# Patient Record
(demographics unavailable — no encounter records)

---

## 2024-10-20 NOTE — PHYSICAL EXAM
[General Appearance - Well Developed] : well developed [Normal Appearance] : normal appearance [Well Groomed] : well groomed [General Appearance - Well Nourished] : well nourished [No Deformities] : no deformities [General Appearance - In No Acute Distress] : no acute distress [Normal Conjunctiva] : the conjunctiva exhibited no abnormalities [Eyelids - No Xanthelasma] : the eyelids demonstrated no xanthelasmas [Normal Oral Mucosa] : normal oral mucosa [No Oral Pallor] : no oral pallor [No Oral Cyanosis] : no oral cyanosis [Normal Jugular Venous A Waves Present] : normal jugular venous A waves present [Normal Jugular Venous V Waves Present] : normal jugular venous V waves present [No Jugular Venous Lara A Waves] : no jugular venous lara A waves [Heart Rate And Rhythm] : heart rate and rhythm were normal [Heart Sounds] : normal S1 and S2 [Murmurs] : no murmurs present [Arterial Pulses Normal] : the arterial pulses were normal [Edema] : no peripheral edema present [Respiration, Rhythm And Depth] : normal respiratory rhythm and effort [Exaggerated Use Of Accessory Muscles For Inspiration] : no accessory muscle use [Auscultation Breath Sounds / Voice Sounds] : lungs were clear to auscultation bilaterally [Abdomen Soft] : soft [Abdomen Tenderness] : non-tender [Abdomen Mass (___ Cm)] : no abdominal mass palpated [Abnormal Walk] : normal gait [Gait - Sufficient For Exercise Testing] : the gait was sufficient for exercise testing [Nail Clubbing] : no clubbing of the fingernails [Cyanosis, Localized] : no localized cyanosis [Petechial Hemorrhages (___cm)] : no petechial hemorrhages [] : no ischemic changes [Oriented To Time, Place, And Person] : oriented to person, place, and time [Affect] : the affect was normal [Mood] : the mood was normal [No Anxiety] : not feeling anxious

## 2024-10-30 NOTE — REASON FOR VISIT
[FreeTextEntry1] : 61 year-old female with no significant past medical history presents for followup.    Patient was last seen on 12/26/23 - Patient reports CP described as sharp pain, palpitations, SOB, almost every night for 2-3 months.  Patient reports HA for 2 weeks.  Patient reports BP difference between 2 arms.  Patient reports dizziness, feeling like she was punched.  MRI with PCP unremarkable.  Patient reports family history of cardiac dz.  I advised patient to undergo an echocardiogram and a treadmill stress test.  Patient underwent an echocardiogram and it showed normal LV function without significant valvular pathology. Patient underwent a treadmill stress test and completed 9 minutes of Josué protocol.  There were upsloping ST depressions on ECG but no symptoms.  Following treadmill stress, there was no echocardiographic evidence of ischemia.  Patient wore a Holter and it showed average HR of 88, rare APC's, frequent PVC's (11%) without significant arrhythmia.  I advised patient to try 1/2 tab of Metoprolol ER 25 mg.

## 2024-10-30 NOTE — HISTORY OF PRESENT ILLNESS
[FreeTextEntry1] : 10/24/24 - Please refer to NP note below.  Pt reports occasional left CP for 3 months, not related to activities, lasting for a few minutes, relieved with deep breathing. Pt also reports occasional dizziness for 3 months, described as hit on something, lasting for a few minutes. Pt reports occasional palpitations at night for 3 months, lasting for a few hours. Pt reports occasional SOB associated with CP and palpitations. Pt reports poor sleeping and fatigue recently.  Pt is on Propranolol 20mg.  Today's /74 P 64.  Exam unremarkable.  ECG showed NSR with PVC's.  I advised patient to undergo a treadmill stress test.  I advised patient to undergo a CTA of coronary arteries.  I will obtain insurance authorization.    12/26/23 - Patient reports CP described as sharp pain, palpitations, SOB, almost every night for 2-3 months.  Patient reports HA for 2 weeks.  Patient reports BP difference between 2 arms.  Patient reports dizziness, feeling like she was punched.  MRI with PCP unremarkable.  Patient reports family history of cardiac dz.  I advised patient to undergo an echocardiogram and a treadmill stress test.  Patient underwent an echocardiogram and it showed normal LV function without significant valvular pathology. Patient underwent a treadmill stress test and completed 9 minutes of Josué protocol.  There were upsloping ST depressions on ECG but no symptoms.  Following treadmill stress, there was no echocardiographic evidence of ischemia.  Patient wore a Holter and it showed average HR of 88, rare APC's, frequent PVC's (11%) without significant arrhythmia.  I advised patient to try 1/2 tab of Metoprolol ER 25 mg.     7/28/15 - Patient reports that last night while on a flight back from [place] she experienced sudden onset of substernal chest discomfort, extending from her throat to lower chest, described as squeezing discomfort, not related to exertion, lasting 5 minutes.  She felt like she was unable to breath.  After she arrived home, she experienced another episode, lasting 10 minutes.  She reports occasional skipped heartbeats.  At baseline, patient denies chest pain or shortness of breath with exertion. She reports occasional palpitations. She denies history of syncope.  She had EGD 3 years ago.  She was positive of H. Pylori but was unable to tolerate the antibiotics.  Patient underwent a stress echo and completed 7 minutes of Josué protocol. There was no ECG or echocardiographic evidence of ischemia.  Her symptom may be GI in etiology.

## 2025-02-07 NOTE — HEALTH RISK ASSESSMENT
[Patient reported mammogram was normal] : Patient reported mammogram was normal [Patient reported PAP Smear was normal] : Patient reported PAP Smear was normal [Patient reported bone density results were normal] : Patient reported bone density results were normal [Patient reported colonoscopy was normal] : Patient reported colonoscopy was normal [Fully functional (bathing, dressing, toileting, transferring, walking, feeding)] : Fully functional (bathing, dressing, toileting, transferring, walking, feeding) [Fully functional (using the telephone, shopping, preparing meals, housekeeping, doing laundry, using] : Fully functional and needs no help or supervision to perform IADLs (using the telephone, shopping, preparing meals, housekeeping, doing laundry, using transportation, managing medications and managing finances) [Reports normal functional visual acuity (ie: able to read med bottle)] : Reports normal functional visual acuity [Good] : ~his/her~  mood as  good [No falls in past year] : Patient reported no falls in the past year [Never] : Never [NO] : No [Reports changes in hearing] : Reports no changes in hearing [Reports changes in vision] : Reports no changes in vision [Reports changes in dental health] : Reports no changes in dental health [MammogramDate] : 9/24 [PapSmearDate] : 2019 [BoneDensityDate] : 9/24 [ColonoscopyDate] : 7/21 [ColonoscopyComments] : 10 years

## 2025-02-07 NOTE — PHYSICAL EXAM
[Grossly Normal Strength/Tone] : grossly normal strength/tone [Normal] : affect was normal and insight and judgment were intact [de-identified] : b/l calves soft, not tender. [de-identified] : 2 erythematous raised areas of swelling, very tender and hot to touch.

## 2025-02-07 NOTE — HISTORY OF PRESENT ILLNESS
[FreeTextEntry1] : annual [de-identified] : 63 yo F with hx PVCs presents for annual.  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  b/l calf spasms-- needs to massage out daily. Calves harden and gets very painful. Same with shoulders/neck. Pt does go on daily walks-- at least 10k steps. Doesn't stretch before or after. Denies stressors.   SOB-- friends have noticed she sighs or takes deep breaths frequently. Pt seen cardiology -- CTA with calcium score 0. No lung findings. Pt concerned for lung findings as she was exposed to secondhand smoke.   Received 2nd dose shingrix yesterday. Arm painful. 2 red areas of swelling, but only 1 needle.

## 2025-02-12 NOTE — ASSESSMENT
[FreeTextEntry1] : The benefits of adequate calcium intake and a daily multivitamin along with routine daily cardiovascular exercise were reviewed with the patient.  The patient was informed regarding the benefits of a YEARLY screening FOR SKIN CANCER  The importance of safer-sex was discussed with the patient. We reviewed ASCCP/ACOG guidelines for pap smears. DISCUSSED PRECAUTIONS AGAINST COVID19, INCLUDING MASK WEARING, SOCIAL DISTANCING AND HAND WASHING.  PAP DONE             DEXA     REF GIVEN            TEOFILO AND BREAST US REF GIVEN-Recommended following up with dermatology for annual skin surveillance. -Follow up in 1 year for annual GYN exam or PRN. All questions and concerns were discussed.

## 2025-02-12 NOTE — HISTORY OF PRESENT ILLNESS
[FreeTextEntry1] : HERE FOR pap smear/GYN examination. Feels well.   [de-identified] : HERE FOR pap smear/GYN examination. Feels well.   Mammogram: 9/24. Patient reported mammogram was normal.   PAP Smear: 2019. Patient reported PAP Smear was normal.  Colonoscopy: 7/21. Patient reported colonoscopy was normal. 10 years.   Diet-- ok exercise-- not exercising

## 2025-02-12 NOTE — PHYSICAL EXAM
[Normal] : no acute distress, well nourished, well developed and well-appearing [Clear to Auscultation] : lungs were clear to auscultation bilaterally [Normal S1, S2] : normal S1 and S2 [Normal Appearance] : normal in appearance [No Masses] : no palpable masses [No Nipple Discharge] : no nipple discharge [No Axillary Lymphadenopathy] : no axillary lymphadenopathy [Urethral Meatus] : normal urethra [Urinary Bladder Findings] : the bladder was normal on palpation [External Female Genitalia] : normal external genitalia [Vagina] : normal vaginal exam [Cervix] : normal cervix [Uterus] : uterus was normal size, without masses or tenderness [Uterine Adnexae] : normal adnexa [Anus Abnormality] : the anus and perineum were normal [Rectal Exam - Rectum] : the rectal exam was normal [Chaperone Present] : A chaperone was present in the examining room during all aspects of the physical examination [[id number]] : A chaperone was present during the pelvic exam. [FreeTextEntry2] : VEDA ACEVES